# Patient Record
Sex: MALE | Race: BLACK OR AFRICAN AMERICAN
[De-identification: names, ages, dates, MRNs, and addresses within clinical notes are randomized per-mention and may not be internally consistent; named-entity substitution may affect disease eponyms.]

---

## 2020-02-10 NOTE — RAD
Exam: 

3 VIEWS CERVICAL SPINE:



HISTORY: 

Neck pain. Extensive cervical fusion.

Status post surgery



FINDINGS: 

Anterior fusion plate with transvertebral body screw at C4, C5, C6 and C7. There are bilateral poster
ior element screws at C4, C5, C6 and C7. C4-C5, C5-C6 and C6-C7 disc prosthesis

Moderate degenerative disc disease at C3-C4. No fractures. Straightening of cervical lordosis is pres
umed to be due to extensive fusion.

Cervicothoracic junction is unremarkable

Limited evaluation of the cervical spine in the AP projection due to overlying facial soft tissues, m
axilla and mandible



IMPRESSION: 

Extensive cervical fusion changes.



Transcribed Date/Time: 2/10/2020 2:41 PM



Reported By: Nehemias Montiel 

Electronically Signed:  2/10/2020 3:13 PM

## 2021-06-02 ENCOUNTER — HOSPITAL ENCOUNTER (OUTPATIENT)
Dept: HOSPITAL 92 - LABBT | Age: 86
Discharge: HOME | End: 2021-06-02
Attending: ORTHOPAEDIC SURGERY
Payer: MEDICARE

## 2021-06-02 DIAGNOSIS — Z20.822: ICD-10-CM

## 2021-06-02 DIAGNOSIS — Z01.818: Primary | ICD-10-CM

## 2021-06-02 DIAGNOSIS — G56.03: ICD-10-CM

## 2021-06-02 LAB
BASOPHILS # BLD AUTO: 0 10X3/UL (ref 0–0.2)
BASOPHILS NFR BLD AUTO: 0.4 % (ref 0–2)
EOSINOPHIL # BLD AUTO: 0.2 10X3/UL (ref 0–0.5)
EOSINOPHIL NFR BLD AUTO: 3.5 % (ref 0–6)
HGB BLD-MCNC: 12.9 G/DL (ref 13.5–17.5)
LYMPHOCYTES NFR BLD AUTO: 27.8 % (ref 18–47)
MCH RBC QN AUTO: 32.3 PG (ref 27–33)
MCV RBC AUTO: 100 FL (ref 81.2–95.1)
MONOCYTES # BLD AUTO: 0.6 10X3/UL (ref 0–1.1)
MONOCYTES NFR BLD AUTO: 11.2 % (ref 0–10)
NEUTROPHILS # BLD AUTO: 2.8 10X3/UL (ref 1.5–8.4)
NEUTROPHILS NFR BLD AUTO: 56.7 % (ref 40–75)
PLATELET # BLD AUTO: 123 10X3/UL (ref 150–450)
RBC # BLD AUTO: 3.99 10X6/UL (ref 4.32–5.72)
WBC # BLD AUTO: 4.9 10X3/UL (ref 3.5–10.5)

## 2021-06-02 PROCEDURE — 85025 COMPLETE CBC W/AUTO DIFF WBC: CPT

## 2021-06-02 PROCEDURE — U0003 INFECTIOUS AGENT DETECTION BY NUCLEIC ACID (DNA OR RNA); SEVERE ACUTE RESPIRATORY SYNDROME CORONAVIRUS 2 (SARS-COV-2) (CORONAVIRUS DISEASE [COVID-19]), AMPLIFIED PROBE TECHNIQUE, MAKING USE OF HIGH THROUGHPUT TECHNOLOGIES AS DESCRIBED BY CMS-2020-01-R: HCPCS

## 2021-06-02 PROCEDURE — 93010 ELECTROCARDIOGRAM REPORT: CPT

## 2021-06-02 PROCEDURE — 93005 ELECTROCARDIOGRAM TRACING: CPT

## 2021-06-02 PROCEDURE — U0005 INFEC AGEN DETEC AMPLI PROBE: HCPCS

## 2021-06-03 LAB
BACTERIA UR QL AUTO: (no result) HPF
RBC UR QL AUTO: (no result) HPF (ref 0–3)
SP GR UR STRIP: 1.02 (ref 1–1.04)

## 2021-06-07 ENCOUNTER — HOSPITAL ENCOUNTER (OUTPATIENT)
Dept: HOSPITAL 92 - SDC | Age: 86
Discharge: HOME | End: 2021-06-07
Attending: ORTHOPAEDIC SURGERY
Payer: MEDICARE

## 2021-06-07 VITALS — BODY MASS INDEX: 20.6 KG/M2

## 2021-06-07 DIAGNOSIS — M20.092: ICD-10-CM

## 2021-06-07 DIAGNOSIS — M24.512: ICD-10-CM

## 2021-06-07 DIAGNOSIS — G56.03: Primary | ICD-10-CM

## 2021-06-07 DIAGNOSIS — M24.522: ICD-10-CM

## 2021-06-07 DIAGNOSIS — M18.0: ICD-10-CM

## 2021-06-07 DIAGNOSIS — I11.9: ICD-10-CM

## 2021-06-07 DIAGNOSIS — J45.909: ICD-10-CM

## 2021-06-07 DIAGNOSIS — M75.02: ICD-10-CM

## 2021-06-07 DIAGNOSIS — Z87.891: ICD-10-CM

## 2021-06-07 DIAGNOSIS — M75.112: ICD-10-CM

## 2021-06-07 DIAGNOSIS — E78.00: ICD-10-CM

## 2021-06-07 DIAGNOSIS — G56.23: ICD-10-CM

## 2021-06-07 DIAGNOSIS — Z95.1: ICD-10-CM

## 2021-06-07 PROCEDURE — S0028 INJECTION, FAMOTIDINE, 20 MG: HCPCS

## 2021-06-07 PROCEDURE — S0020 INJECTION, BUPIVICAINE HYDRO: HCPCS

## 2021-06-07 PROCEDURE — 01N50ZZ RELEASE MEDIAN NERVE, OPEN APPROACH: ICD-10-PCS | Performed by: ORTHOPAEDIC SURGERY

## 2021-06-08 ENCOUNTER — HOSPITAL ENCOUNTER (EMERGENCY)
Dept: HOSPITAL 92 - ERS | Age: 86
Discharge: HOME | End: 2021-06-08
Payer: MEDICARE

## 2021-06-08 DIAGNOSIS — I25.10: ICD-10-CM

## 2021-06-08 DIAGNOSIS — Z79.82: ICD-10-CM

## 2021-06-08 DIAGNOSIS — R33.9: ICD-10-CM

## 2021-06-08 DIAGNOSIS — I10: ICD-10-CM

## 2021-06-08 DIAGNOSIS — Z79.899: ICD-10-CM

## 2021-06-08 DIAGNOSIS — I25.2: ICD-10-CM

## 2021-06-08 DIAGNOSIS — N99.89: Primary | ICD-10-CM

## 2021-06-08 DIAGNOSIS — J45.909: ICD-10-CM

## 2021-06-08 LAB
BACTERIA UR QL AUTO: (no result) HPF
SP GR UR STRIP: 1.02 (ref 1–1.03)
WBC UR QL AUTO: (no result) HPF (ref 0–3)

## 2021-06-08 PROCEDURE — 51702 INSERT TEMP BLADDER CATH: CPT

## 2021-06-08 PROCEDURE — 81003 URINALYSIS AUTO W/O SCOPE: CPT

## 2021-06-08 PROCEDURE — 81015 MICROSCOPIC EXAM OF URINE: CPT

## 2021-06-08 PROCEDURE — 87086 URINE CULTURE/COLONY COUNT: CPT

## 2021-06-08 PROCEDURE — 99283 EMERGENCY DEPT VISIT LOW MDM: CPT

## 2021-06-08 PROCEDURE — 51798 US URINE CAPACITY MEASURE: CPT

## 2021-06-11 ENCOUNTER — HOSPITAL ENCOUNTER (EMERGENCY)
Dept: HOSPITAL 92 - ERS | Age: 86
Discharge: HOME | End: 2021-06-11
Payer: MEDICARE

## 2021-06-11 DIAGNOSIS — I25.2: ICD-10-CM

## 2021-06-11 DIAGNOSIS — Z79.899: ICD-10-CM

## 2021-06-11 DIAGNOSIS — J45.909: ICD-10-CM

## 2021-06-11 DIAGNOSIS — I10: ICD-10-CM

## 2021-06-11 DIAGNOSIS — Z79.82: ICD-10-CM

## 2021-06-11 DIAGNOSIS — R55: Primary | ICD-10-CM

## 2021-06-11 DIAGNOSIS — I25.10: ICD-10-CM

## 2021-06-11 LAB
ALBUMIN SERPL BCG-MCNC: 3.4 G/DL (ref 3.4–4.8)
ALP SERPL-CCNC: 69 U/L (ref 40–110)
ALT SERPL W P-5'-P-CCNC: 11 U/L (ref 8–55)
ANION GAP SERPL CALC-SCNC: 12 MMOL/L (ref 10–20)
AST SERPL-CCNC: 16 U/L (ref 5–34)
BACTERIA UR QL AUTO: (no result) HPF
BASOPHILS # BLD AUTO: 0 THOU/UL (ref 0–0.2)
BASOPHILS NFR BLD AUTO: 0.7 % (ref 0–1)
BILIRUB SERPL-MCNC: 0.6 MG/DL (ref 0.2–1.2)
BUN SERPL-MCNC: 23 MG/DL (ref 8.4–25.7)
CALCIUM SERPL-MCNC: 9 MG/DL (ref 7.8–10.44)
CHLORIDE SERPL-SCNC: 108 MMOL/L (ref 98–107)
CO2 SERPL-SCNC: 25 MMOL/L (ref 23–31)
CREAT CL PREDICTED SERPL C-G-VRATE: 0 ML/MIN (ref 70–130)
EOSINOPHIL # BLD AUTO: 0.1 THOU/UL (ref 0–0.7)
EOSINOPHIL NFR BLD AUTO: 2.1 % (ref 0–10)
GLOBULIN SER CALC-MCNC: 2.8 G/DL (ref 2.4–3.5)
GLUCOSE SERPL-MCNC: 127 MG/DL (ref 83–110)
HGB BLD-MCNC: 12.9 G/DL (ref 14–18)
LYMPHOCYTES # BLD: 1 THOU/UL (ref 1.2–3.4)
LYMPHOCYTES NFR BLD AUTO: 15.8 % (ref 21–51)
MCH RBC QN AUTO: 33.6 PG (ref 27–31)
MCV RBC AUTO: 104 FL (ref 78–98)
MONOCYTES # BLD AUTO: 0.8 THOU/UL (ref 0.11–0.59)
MONOCYTES NFR BLD AUTO: 11.9 % (ref 0–10)
NEUTROPHILS # BLD AUTO: 4.6 THOU/UL (ref 1.4–6.5)
NEUTROPHILS NFR BLD AUTO: 69.5 % (ref 42–75)
PLATELET # BLD AUTO: 121 THOU/UL (ref 130–400)
POTASSIUM SERPL-SCNC: 4.1 MMOL/L (ref 3.5–5.1)
PROT UR STRIP.AUTO-MCNC: 30 MG/DL
RBC # BLD AUTO: 3.82 MILL/UL (ref 4.7–6.1)
SODIUM SERPL-SCNC: 141 MMOL/L (ref 136–145)
SP GR UR STRIP: 1.02 (ref 1–1.03)
WBC # BLD AUTO: 6.6 THOU/UL (ref 4.8–10.8)
WBC UR QL AUTO: (no result) HPF (ref 0–3)

## 2021-06-11 PROCEDURE — 84484 ASSAY OF TROPONIN QUANT: CPT

## 2021-06-11 PROCEDURE — 36415 COLL VENOUS BLD VENIPUNCTURE: CPT

## 2021-06-11 PROCEDURE — 80053 COMPREHEN METABOLIC PANEL: CPT

## 2021-06-11 PROCEDURE — 85025 COMPLETE CBC W/AUTO DIFF WBC: CPT

## 2021-06-11 PROCEDURE — 81003 URINALYSIS AUTO W/O SCOPE: CPT

## 2021-06-11 PROCEDURE — 81015 MICROSCOPIC EXAM OF URINE: CPT

## 2021-06-11 PROCEDURE — 93005 ELECTROCARDIOGRAM TRACING: CPT

## 2021-07-26 ENCOUNTER — HOSPITAL ENCOUNTER (OUTPATIENT)
Dept: HOSPITAL 92 - LABBT | Age: 86
Discharge: HOME | End: 2021-07-26
Attending: ORTHOPAEDIC SURGERY
Payer: MEDICARE

## 2021-07-26 DIAGNOSIS — R33.8: ICD-10-CM

## 2021-07-26 DIAGNOSIS — I25.10: ICD-10-CM

## 2021-07-26 DIAGNOSIS — R31.29: ICD-10-CM

## 2021-07-26 DIAGNOSIS — Z87.440: ICD-10-CM

## 2021-07-26 DIAGNOSIS — N40.1: ICD-10-CM

## 2021-07-26 DIAGNOSIS — R39.14: ICD-10-CM

## 2021-07-26 DIAGNOSIS — Z86.19: ICD-10-CM

## 2021-07-26 DIAGNOSIS — Z01.818: Primary | ICD-10-CM

## 2021-07-26 LAB
ANION GAP SERPL CALC-SCNC: 10 MMOL/L (ref 10–20)
APTT PPP: 27.5 SEC (ref 22–33)
BUN SERPL-MCNC: 15 MG/DL (ref 8.4–25.7)
CALCIUM SERPL-MCNC: 9.8 MG/DL (ref 7.8–10.44)
CHLORIDE SERPL-SCNC: 108 MMOL/L (ref 98–107)
CO2 SERPL-SCNC: 27 MMOL/L (ref 23–31)
CREAT CL PREDICTED SERPL C-G-VRATE: 0 ML/MIN (ref 70–130)
GLUCOSE SERPL-MCNC: 106 MG/DL (ref 83–110)
HGB BLD-MCNC: 12.7 G/DL (ref 13.5–17.5)
INR PPP: 1.1
MCH RBC QN AUTO: 31.8 PG (ref 27–33)
MCV RBC AUTO: 97 FL (ref 81.2–95.1)
PLATELET # BLD AUTO: 187 10X3/UL (ref 150–450)
POTASSIUM SERPL-SCNC: 4.4 MMOL/L (ref 3.5–5.1)
PROTHROMBIN TIME: 12.2 SEC (ref 9.5–12.1)
RBC # BLD AUTO: 3.99 10X6/UL (ref 4.32–5.72)
SODIUM SERPL-SCNC: 141 MMOL/L (ref 136–145)
WBC # BLD AUTO: 4.4 10X3/UL (ref 3.5–10.5)

## 2021-07-26 PROCEDURE — 93005 ELECTROCARDIOGRAM TRACING: CPT

## 2021-07-26 PROCEDURE — 85730 THROMBOPLASTIN TIME PARTIAL: CPT

## 2021-07-26 PROCEDURE — 85027 COMPLETE CBC AUTOMATED: CPT

## 2021-07-26 PROCEDURE — 85610 PROTHROMBIN TIME: CPT

## 2021-07-26 PROCEDURE — 80048 BASIC METABOLIC PNL TOTAL CA: CPT

## 2021-07-26 PROCEDURE — 93010 ELECTROCARDIOGRAM REPORT: CPT

## 2021-07-26 PROCEDURE — 81001 URINALYSIS AUTO W/SCOPE: CPT

## 2021-10-20 ENCOUNTER — HOSPITAL ENCOUNTER (OUTPATIENT)
Dept: HOSPITAL 92 - LABBT | Age: 86
Discharge: HOME | End: 2021-10-20
Attending: ORTHOPAEDIC SURGERY
Payer: MEDICARE

## 2021-10-20 DIAGNOSIS — Z20.822: ICD-10-CM

## 2021-10-20 DIAGNOSIS — Z01.812: Primary | ICD-10-CM

## 2021-10-20 DIAGNOSIS — M24.50: ICD-10-CM

## 2021-10-20 LAB
ANION GAP SERPL CALC-SCNC: 14 MMOL/L (ref 10–20)
BASOPHILS # BLD AUTO: 0 10X3/UL (ref 0–0.2)
BASOPHILS NFR BLD AUTO: 0.7 % (ref 0–2)
BUN SERPL-MCNC: 24 MG/DL (ref 8.4–25.7)
CALCIUM SERPL-MCNC: 9.4 MG/DL (ref 7.8–10.44)
CHLORIDE SERPL-SCNC: 109 MMOL/L (ref 98–107)
CO2 SERPL-SCNC: 24 MMOL/L (ref 23–31)
CREAT CL PREDICTED SERPL C-G-VRATE: 0 ML/MIN (ref 70–130)
EOSINOPHIL # BLD AUTO: 0.3 10X3/UL (ref 0–0.5)
EOSINOPHIL NFR BLD AUTO: 6.3 % (ref 0–6)
GLUCOSE SERPL-MCNC: 114 MG/DL (ref 83–110)
HGB BLD-MCNC: 12.2 G/DL (ref 13.5–17.5)
LYMPHOCYTES NFR BLD AUTO: 32.9 % (ref 18–47)
MCH RBC QN AUTO: 32.7 PG (ref 27–33)
MCV RBC AUTO: 100.5 FL (ref 81.2–95.1)
MONOCYTES # BLD AUTO: 0.4 10X3/UL (ref 0–1.1)
MONOCYTES NFR BLD AUTO: 8.4 % (ref 0–10)
NEUTROPHILS # BLD AUTO: 2.2 10X3/UL (ref 1.5–8.4)
NEUTROPHILS NFR BLD AUTO: 51.5 % (ref 40–75)
PLATELET # BLD AUTO: 107 10X3/UL (ref 150–450)
POTASSIUM SERPL-SCNC: 4.6 MMOL/L (ref 3.5–5.1)
RBC # BLD AUTO: 3.73 10X6/UL (ref 4.32–5.72)
SODIUM SERPL-SCNC: 142 MMOL/L (ref 136–145)
WBC # BLD AUTO: 4.3 10X3/UL (ref 3.5–10.5)

## 2021-10-20 PROCEDURE — 80048 BASIC METABOLIC PNL TOTAL CA: CPT

## 2021-10-20 PROCEDURE — U0003 INFECTIOUS AGENT DETECTION BY NUCLEIC ACID (DNA OR RNA); SEVERE ACUTE RESPIRATORY SYNDROME CORONAVIRUS 2 (SARS-COV-2) (CORONAVIRUS DISEASE [COVID-19]), AMPLIFIED PROBE TECHNIQUE, MAKING USE OF HIGH THROUGHPUT TECHNOLOGIES AS DESCRIBED BY CMS-2020-01-R: HCPCS

## 2021-10-20 PROCEDURE — 85025 COMPLETE CBC W/AUTO DIFF WBC: CPT

## 2021-10-20 PROCEDURE — U0005 INFEC AGEN DETEC AMPLI PROBE: HCPCS

## 2021-10-25 ENCOUNTER — HOSPITAL ENCOUNTER (OUTPATIENT)
Dept: HOSPITAL 92 - SDC | Age: 86
Discharge: HOME | End: 2021-10-25
Attending: ORTHOPAEDIC SURGERY
Payer: MEDICARE

## 2021-10-25 VITALS — BODY MASS INDEX: 20 KG/M2

## 2021-10-25 DIAGNOSIS — I25.10: ICD-10-CM

## 2021-10-25 DIAGNOSIS — Z79.899: ICD-10-CM

## 2021-10-25 DIAGNOSIS — J45.909: ICD-10-CM

## 2021-10-25 DIAGNOSIS — M24.542: ICD-10-CM

## 2021-10-25 DIAGNOSIS — M24.532: ICD-10-CM

## 2021-10-25 DIAGNOSIS — Z98.1: ICD-10-CM

## 2021-10-25 DIAGNOSIS — Z95.1: ICD-10-CM

## 2021-10-25 DIAGNOSIS — M24.522: Primary | ICD-10-CM

## 2021-10-25 DIAGNOSIS — I10: ICD-10-CM

## 2021-10-25 DIAGNOSIS — G56.03: ICD-10-CM

## 2021-10-25 DIAGNOSIS — G56.23: ICD-10-CM

## 2021-10-25 DIAGNOSIS — M48.02: ICD-10-CM

## 2021-10-25 DIAGNOSIS — I25.2: ICD-10-CM

## 2021-10-25 DIAGNOSIS — M75.122: ICD-10-CM

## 2021-10-25 DIAGNOSIS — Z98.890: ICD-10-CM

## 2021-10-25 DIAGNOSIS — M18.0: ICD-10-CM

## 2021-10-25 DIAGNOSIS — Z79.82: ICD-10-CM

## 2021-10-25 DIAGNOSIS — M75.02: ICD-10-CM

## 2021-10-25 DIAGNOSIS — Z87.891: ICD-10-CM

## 2021-10-25 PROCEDURE — 64642 CHEMODENERV 1 EXTREMITY 1-4: CPT

## 2021-10-25 PROCEDURE — S0020 INJECTION, BUPIVICAINE HYDRO: HCPCS

## 2021-10-25 PROCEDURE — 3E023GC INTRODUCTION OF OTHER THERAPEUTIC SUBSTANCE INTO MUSCLE, PERCUTANEOUS APPROACH: ICD-10-PCS | Performed by: ORTHOPAEDIC SURGERY

## 2021-11-18 ENCOUNTER — HOSPITAL ENCOUNTER (EMERGENCY)
Dept: HOSPITAL 92 - ERS | Age: 86
Discharge: LEFT BEFORE DISCHARGE | End: 2021-11-18
Payer: MEDICARE

## 2021-11-18 DIAGNOSIS — I10: ICD-10-CM

## 2021-11-18 DIAGNOSIS — J45.909: ICD-10-CM

## 2021-11-18 DIAGNOSIS — I25.10: ICD-10-CM

## 2021-11-18 DIAGNOSIS — I25.2: ICD-10-CM

## 2021-11-18 DIAGNOSIS — R00.1: Primary | ICD-10-CM

## 2021-11-18 LAB
ALBUMIN SERPL BCG-MCNC: 3.5 G/DL (ref 3.4–4.8)
ALP SERPL-CCNC: 46 U/L (ref 40–110)
ALT SERPL W P-5'-P-CCNC: 11 U/L (ref 8–55)
ANION GAP SERPL CALC-SCNC: 13 MMOL/L (ref 10–20)
AST SERPL-CCNC: 27 U/L (ref 5–34)
BASOPHILS # BLD AUTO: 0 THOU/UL (ref 0–0.2)
BASOPHILS NFR BLD AUTO: 0.7 % (ref 0–1)
BILIRUB SERPL-MCNC: 0.4 MG/DL (ref 0.2–1.2)
BUN SERPL-MCNC: 20 MG/DL (ref 8.4–25.7)
CALCIUM SERPL-MCNC: 9.7 MG/DL (ref 7.8–10.44)
CHLORIDE SERPL-SCNC: 107 MMOL/L (ref 98–107)
CO2 SERPL-SCNC: 23 MMOL/L (ref 23–31)
CREAT CL PREDICTED SERPL C-G-VRATE: 0 ML/MIN (ref 70–130)
EOSINOPHIL # BLD AUTO: 0.2 THOU/UL (ref 0–0.7)
EOSINOPHIL NFR BLD AUTO: 4.5 % (ref 0–10)
GLOBULIN SER CALC-MCNC: 4 G/DL (ref 2.4–3.5)
GLUCOSE SERPL-MCNC: 119 MG/DL (ref 83–110)
HGB BLD-MCNC: 13.7 G/DL (ref 14–18)
LIPASE SERPL-CCNC: 16 U/L (ref 8–78)
LYMPHOCYTES # BLD: 1.4 THOU/UL (ref 1.2–3.4)
LYMPHOCYTES NFR BLD AUTO: 31.5 % (ref 21–51)
MCH RBC QN AUTO: 34.4 PG (ref 27–31)
MCV RBC AUTO: 103 FL (ref 78–98)
MONOCYTES # BLD AUTO: 0.3 THOU/UL (ref 0.11–0.59)
MONOCYTES NFR BLD AUTO: 7.6 % (ref 0–10)
NEUTROPHILS # BLD AUTO: 2.5 THOU/UL (ref 1.4–6.5)
NEUTROPHILS NFR BLD AUTO: 55.7 % (ref 42–75)
PLATELET # BLD AUTO: 104 THOU/UL (ref 130–400)
POTASSIUM SERPL-SCNC: 5.4 MMOL/L (ref 3.5–5.1)
RBC # BLD AUTO: 3.99 MILL/UL (ref 4.7–6.1)
SODIUM SERPL-SCNC: 138 MMOL/L (ref 136–145)
WBC # BLD AUTO: 4.5 THOU/UL (ref 4.8–10.8)

## 2021-11-18 PROCEDURE — 93005 ELECTROCARDIOGRAM TRACING: CPT

## 2021-11-18 PROCEDURE — 84443 ASSAY THYROID STIM HORMONE: CPT

## 2021-11-18 PROCEDURE — 84484 ASSAY OF TROPONIN QUANT: CPT

## 2021-11-18 PROCEDURE — 80053 COMPREHEN METABOLIC PANEL: CPT

## 2021-11-18 PROCEDURE — 83690 ASSAY OF LIPASE: CPT

## 2021-11-18 PROCEDURE — 71045 X-RAY EXAM CHEST 1 VIEW: CPT

## 2021-11-18 PROCEDURE — 85025 COMPLETE CBC W/AUTO DIFF WBC: CPT

## 2021-12-21 ENCOUNTER — HOSPITAL ENCOUNTER (INPATIENT)
Dept: HOSPITAL 92 - ERS | Age: 86
LOS: 3 days | Discharge: HOME HEALTH | DRG: 871 | End: 2021-12-24
Attending: FAMILY MEDICINE | Admitting: FAMILY MEDICINE
Payer: MEDICARE

## 2021-12-21 VITALS — BODY MASS INDEX: 18.5 KG/M2

## 2021-12-21 DIAGNOSIS — Z90.49: ICD-10-CM

## 2021-12-21 DIAGNOSIS — E44.1: ICD-10-CM

## 2021-12-21 DIAGNOSIS — A41.9: Primary | ICD-10-CM

## 2021-12-21 DIAGNOSIS — R65.20: ICD-10-CM

## 2021-12-21 DIAGNOSIS — Z79.82: ICD-10-CM

## 2021-12-21 DIAGNOSIS — R74.01: ICD-10-CM

## 2021-12-21 DIAGNOSIS — K26.9: ICD-10-CM

## 2021-12-21 DIAGNOSIS — M19.90: ICD-10-CM

## 2021-12-21 DIAGNOSIS — Z20.822: ICD-10-CM

## 2021-12-21 DIAGNOSIS — M24.542: ICD-10-CM

## 2021-12-21 DIAGNOSIS — Z79.899: ICD-10-CM

## 2021-12-21 DIAGNOSIS — E78.5: ICD-10-CM

## 2021-12-21 DIAGNOSIS — Z79.51: ICD-10-CM

## 2021-12-21 DIAGNOSIS — K80.30: ICD-10-CM

## 2021-12-21 DIAGNOSIS — G93.41: ICD-10-CM

## 2021-12-21 DIAGNOSIS — K83.8: ICD-10-CM

## 2021-12-21 DIAGNOSIS — G45.9: ICD-10-CM

## 2021-12-21 LAB
ALBUMIN SERPL BCG-MCNC: 3.4 G/DL (ref 3.4–4.8)
ALP SERPL-CCNC: 160 U/L (ref 40–110)
ALT SERPL W P-5'-P-CCNC: 492 U/L (ref 8–55)
ANION GAP SERPL CALC-SCNC: 13 MMOL/L (ref 10–20)
AST SERPL-CCNC: 771 U/L (ref 5–34)
BASOPHILS # BLD AUTO: 0 THOU/UL (ref 0–0.2)
BASOPHILS NFR BLD AUTO: 0.2 % (ref 0–1)
BILIRUB SERPL-MCNC: 2.1 MG/DL (ref 0.2–1.2)
BUN SERPL-MCNC: 12 MG/DL (ref 8.4–25.7)
CALCIUM SERPL-MCNC: 9.7 MG/DL (ref 7.8–10.44)
CHLORIDE SERPL-SCNC: 105 MMOL/L (ref 98–107)
CO2 SERPL-SCNC: 25 MMOL/L (ref 23–31)
CREAT CL PREDICTED SERPL C-G-VRATE: 0 ML/MIN (ref 70–130)
EOSINOPHIL # BLD AUTO: 0.1 THOU/UL (ref 0–0.7)
EOSINOPHIL NFR BLD AUTO: 1.2 % (ref 0–10)
GLOBULIN SER CALC-MCNC: 3.4 G/DL (ref 2.4–3.5)
GLUCOSE SERPL-MCNC: 131 MG/DL (ref 83–110)
HGB BLD-MCNC: 12.9 G/DL (ref 14–18)
LYMPHOCYTES # BLD: 0.4 THOU/UL (ref 1.2–3.4)
LYMPHOCYTES NFR BLD AUTO: 4.9 % (ref 21–51)
MCH RBC QN AUTO: 34.3 PG (ref 27–31)
MCV RBC AUTO: 103 FL (ref 78–98)
MONOCYTES # BLD AUTO: 0.2 THOU/UL (ref 0.11–0.59)
MONOCYTES NFR BLD AUTO: 2.6 % (ref 0–10)
NEUTROPHILS # BLD AUTO: 6.5 THOU/UL (ref 1.4–6.5)
NEUTROPHILS NFR BLD AUTO: 91.1 % (ref 42–75)
PLATELET # BLD AUTO: 86 THOU/UL (ref 130–400)
POTASSIUM SERPL-SCNC: 3.8 MMOL/L (ref 3.5–5.1)
PROT UR STRIP.AUTO-MCNC: 50 MG/DL
RBC # BLD AUTO: 3.76 MILL/UL (ref 4.7–6.1)
RBC UR QL AUTO: (no result) HPF (ref 0–3)
SODIUM SERPL-SCNC: 139 MMOL/L (ref 136–145)
SP GR UR STRIP: 1.02 (ref 1–1.04)
WBC # BLD AUTO: 7.1 THOU/UL (ref 4.8–10.8)
WBC UR QL AUTO: (no result) HPF (ref 0–3)

## 2021-12-21 PROCEDURE — 0240U: CPT

## 2021-12-21 PROCEDURE — 80061 LIPID PANEL: CPT

## 2021-12-21 PROCEDURE — 36415 COLL VENOUS BLD VENIPUNCTURE: CPT

## 2021-12-21 PROCEDURE — 85025 COMPLETE CBC W/AUTO DIFF WBC: CPT

## 2021-12-21 PROCEDURE — 93005 ELECTROCARDIOGRAM TRACING: CPT

## 2021-12-21 PROCEDURE — 96368 THER/DIAG CONCURRENT INF: CPT

## 2021-12-21 PROCEDURE — 74177 CT ABD & PELVIS W/CONTRAST: CPT

## 2021-12-21 PROCEDURE — 83690 ASSAY OF LIPASE: CPT

## 2021-12-21 PROCEDURE — 87389 HIV-1 AG W/HIV-1&-2 AB AG IA: CPT

## 2021-12-21 PROCEDURE — 82607 VITAMIN B-12: CPT

## 2021-12-21 PROCEDURE — 96365 THER/PROPH/DIAG IV INF INIT: CPT

## 2021-12-21 PROCEDURE — 87149 DNA/RNA DIRECT PROBE: CPT

## 2021-12-21 PROCEDURE — 76705 ECHO EXAM OF ABDOMEN: CPT

## 2021-12-21 PROCEDURE — 70551 MRI BRAIN STEM W/O DYE: CPT

## 2021-12-21 PROCEDURE — 84145 PROCALCITONIN (PCT): CPT

## 2021-12-21 PROCEDURE — 86780 TREPONEMA PALLIDUM: CPT

## 2021-12-21 PROCEDURE — 83605 ASSAY OF LACTIC ACID: CPT

## 2021-12-21 PROCEDURE — 85730 THROMBOPLASTIN TIME PARTIAL: CPT

## 2021-12-21 PROCEDURE — 85610 PROTHROMBIN TIME: CPT

## 2021-12-21 PROCEDURE — 80307 DRUG TEST PRSMV CHEM ANLYZR: CPT

## 2021-12-21 PROCEDURE — 76000 FLUOROSCOPY <1 HR PHYS/QHP: CPT

## 2021-12-21 PROCEDURE — 82746 ASSAY OF FOLIC ACID SERUM: CPT

## 2021-12-21 PROCEDURE — 81015 MICROSCOPIC EXAM OF URINE: CPT

## 2021-12-21 PROCEDURE — 80053 COMPREHEN METABOLIC PANEL: CPT

## 2021-12-21 PROCEDURE — 80074 ACUTE HEPATITIS PANEL: CPT

## 2021-12-21 PROCEDURE — 81003 URINALYSIS AUTO W/O SCOPE: CPT

## 2021-12-21 PROCEDURE — C9113 INJ PANTOPRAZOLE SODIUM, VIA: HCPCS

## 2021-12-21 PROCEDURE — 51701 INSERT BLADDER CATHETER: CPT

## 2021-12-21 PROCEDURE — 87040 BLOOD CULTURE FOR BACTERIA: CPT

## 2021-12-21 PROCEDURE — 74330 X-RAY BILE/PANC ENDOSCOPY: CPT

## 2021-12-21 PROCEDURE — 71045 X-RAY EXAM CHEST 1 VIEW: CPT

## 2021-12-21 PROCEDURE — S0028 INJECTION, FAMOTIDINE, 20 MG: HCPCS

## 2021-12-22 LAB
ALBUMIN SERPL BCG-MCNC: 2.9 G/DL (ref 3.4–4.8)
ALP SERPL-CCNC: 147 U/L (ref 40–110)
ALT SERPL W P-5'-P-CCNC: 540 U/L (ref 8–55)
ANION GAP SERPL CALC-SCNC: 10 MMOL/L (ref 10–20)
APTT PPP: 38.7 SEC (ref 22.9–36.1)
AST SERPL-CCNC: 536 U/L (ref 5–34)
BASOPHILS # BLD AUTO: 0 THOU/UL (ref 0–0.2)
BASOPHILS NFR BLD AUTO: 0.2 % (ref 0–1)
BILIRUB SERPL-MCNC: 1.4 MG/DL (ref 0.2–1.2)
BUN SERPL-MCNC: 9 MG/DL (ref 8.4–25.7)
CALCIUM SERPL-MCNC: 9 MG/DL (ref 7.8–10.44)
CHD RISK SERPL-RTO: 2.5 (ref ?–4.5)
CHLORIDE SERPL-SCNC: 109 MMOL/L (ref 98–107)
CHOLEST SERPL-MCNC: 115 MG/DL
CO2 SERPL-SCNC: 25 MMOL/L (ref 23–31)
CREAT CL PREDICTED SERPL C-G-VRATE: 71 ML/MIN (ref 70–130)
EOSINOPHIL # BLD AUTO: 0.2 THOU/UL (ref 0–0.7)
EOSINOPHIL NFR BLD AUTO: 2.7 % (ref 0–10)
GLOBULIN SER CALC-MCNC: 2.8 G/DL (ref 2.4–3.5)
GLUCOSE SERPL-MCNC: 95 MG/DL (ref 83–110)
HBCM INDEX: 0.05 S/CO (ref 0–0.79)
HBSAG INDEX: 0.29 S/CO (ref 0–0.99)
HDLC SERPL-MCNC: 46 MG/DL
HEP A IGM S/CO: 0.2 S/CO (ref 0–0.79)
HEP C INDEX: 0.1 S/CO (ref 0–0.79)
HGB BLD-MCNC: 11.6 G/DL (ref 14–18)
HIV 1/2 INDEX: 0.09 S/CO (ref ?–1)
INR PPP: 1.5
LDLC SERPL CALC-MCNC: 62 MG/DL
LYMPHOCYTES # BLD: 1 THOU/UL (ref 1.2–3.4)
LYMPHOCYTES NFR BLD AUTO: 10.8 % (ref 21–51)
MCH RBC QN AUTO: 34 PG (ref 27–31)
MCV RBC AUTO: 103 FL (ref 78–98)
MONOCYTES # BLD AUTO: 0.7 THOU/UL (ref 0.11–0.59)
MONOCYTES NFR BLD AUTO: 7.9 % (ref 0–10)
NEUTROPHILS # BLD AUTO: 7 THOU/UL (ref 1.4–6.5)
NEUTROPHILS NFR BLD AUTO: 78.4 % (ref 42–75)
PLATELET # BLD AUTO: 76 THOU/UL (ref 130–400)
POTASSIUM SERPL-SCNC: 3.5 MMOL/L (ref 3.5–5.1)
PROTHROMBIN TIME: 18.7 SEC (ref 12–14.7)
RBC # BLD AUTO: 3.42 MILL/UL (ref 4.7–6.1)
SODIUM SERPL-SCNC: 140 MMOL/L (ref 136–145)
SYPHILIS ANTIBODY INDEX: 0.08 S/CO
TRIGL SERPL-MCNC: 36 MG/DL (ref ?–150)
WBC # BLD AUTO: 8.9 THOU/UL (ref 4.8–10.8)

## 2021-12-22 RX ADMIN — Medication SCH TAB: at 10:23

## 2021-12-22 RX ADMIN — MOMETASONE FUROATE AND FORMOTEROL FUMARATE DIHYDRATE SCH PUFF: 200; 5 AEROSOL RESPIRATORY (INHALATION) at 19:26

## 2021-12-23 LAB
ALBUMIN SERPL BCG-MCNC: 2.9 G/DL (ref 3.4–4.8)
ALP SERPL-CCNC: 127 U/L (ref 40–110)
ALT SERPL W P-5'-P-CCNC: 303 U/L (ref 8–55)
ANION GAP SERPL CALC-SCNC: 17 MMOL/L (ref 10–20)
AST SERPL-CCNC: 168 U/L (ref 5–34)
BASOPHILS # BLD AUTO: 0 THOU/UL (ref 0–0.2)
BASOPHILS NFR BLD AUTO: 0.3 % (ref 0–1)
BILIRUB SERPL-MCNC: 1 MG/DL (ref 0.2–1.2)
BUN SERPL-MCNC: 9 MG/DL (ref 8.4–25.7)
CALCIUM SERPL-MCNC: 9.1 MG/DL (ref 7.8–10.44)
CHLORIDE SERPL-SCNC: 107 MMOL/L (ref 98–107)
CO2 SERPL-SCNC: 17 MMOL/L (ref 23–31)
CREAT CL PREDICTED SERPL C-G-VRATE: 71 ML/MIN (ref 70–130)
EOSINOPHIL # BLD AUTO: 0.4 THOU/UL (ref 0–0.7)
EOSINOPHIL NFR BLD AUTO: 6.2 % (ref 0–10)
GLOBULIN SER CALC-MCNC: 3.1 G/DL (ref 2.4–3.5)
GLUCOSE SERPL-MCNC: 82 MG/DL (ref 83–110)
HGB BLD-MCNC: 11.5 G/DL (ref 14–18)
LYMPHOCYTES # BLD: 0.8 THOU/UL (ref 1.2–3.4)
LYMPHOCYTES NFR BLD AUTO: 13.5 % (ref 21–51)
MCH RBC QN AUTO: 33.4 PG (ref 27–31)
MCV RBC AUTO: 103 FL (ref 78–98)
MONOCYTES # BLD AUTO: 0.6 THOU/UL (ref 0.11–0.59)
MONOCYTES NFR BLD AUTO: 9.4 % (ref 0–10)
NEUTROPHILS # BLD AUTO: 4.4 THOU/UL (ref 1.4–6.5)
NEUTROPHILS NFR BLD AUTO: 70.7 % (ref 42–75)
PLATELET # BLD AUTO: 74 THOU/UL (ref 130–400)
POTASSIUM SERPL-SCNC: 3.7 MMOL/L (ref 3.5–5.1)
RBC # BLD AUTO: 3.46 MILL/UL (ref 4.7–6.1)
SODIUM SERPL-SCNC: 137 MMOL/L (ref 136–145)
WBC # BLD AUTO: 6.2 THOU/UL (ref 4.8–10.8)

## 2021-12-23 PROCEDURE — BF131ZZ FLUOROSCOPY OF GALLBLADDER AND BILE DUCTS USING LOW OSMOLAR CONTRAST: ICD-10-PCS | Performed by: INTERNAL MEDICINE

## 2021-12-23 PROCEDURE — 0FC98ZZ EXTIRPATION OF MATTER FROM COMMON BILE DUCT, VIA NATURAL OR ARTIFICIAL OPENING ENDOSCOPIC: ICD-10-PCS | Performed by: INTERNAL MEDICINE

## 2021-12-23 RX ADMIN — MOMETASONE FUROATE AND FORMOTEROL FUMARATE DIHYDRATE SCH PUFF: 200; 5 AEROSOL RESPIRATORY (INHALATION) at 23:16

## 2021-12-23 RX ADMIN — Medication SCH TAB: at 08:09

## 2021-12-24 VITALS — DIASTOLIC BLOOD PRESSURE: 60 MMHG | TEMPERATURE: 98.2 F | SYSTOLIC BLOOD PRESSURE: 106 MMHG

## 2021-12-24 LAB
ALBUMIN SERPL BCG-MCNC: 2.5 G/DL (ref 3.4–4.8)
ALP SERPL-CCNC: 95 U/L (ref 40–110)
ALT SERPL W P-5'-P-CCNC: 200 U/L (ref 8–55)
ANION GAP SERPL CALC-SCNC: 10 MMOL/L (ref 10–20)
AST SERPL-CCNC: 86 U/L (ref 5–34)
BASOPHILS # BLD AUTO: 0 THOU/UL (ref 0–0.2)
BASOPHILS NFR BLD AUTO: 0.6 % (ref 0–1)
BILIRUB SERPL-MCNC: 0.6 MG/DL (ref 0.2–1.2)
BUN SERPL-MCNC: 10 MG/DL (ref 8.4–25.7)
CALCIUM SERPL-MCNC: 8.4 MG/DL (ref 7.8–10.44)
CHLORIDE SERPL-SCNC: 108 MMOL/L (ref 98–107)
CO2 SERPL-SCNC: 25 MMOL/L (ref 23–31)
CREAT CL PREDICTED SERPL C-G-VRATE: 62 ML/MIN (ref 70–130)
EOSINOPHIL # BLD AUTO: 0.3 THOU/UL (ref 0–0.7)
EOSINOPHIL NFR BLD AUTO: 5.6 % (ref 0–10)
GLOBULIN SER CALC-MCNC: 2.5 G/DL (ref 2.4–3.5)
GLUCOSE SERPL-MCNC: 119 MG/DL (ref 83–110)
HGB BLD-MCNC: 11 G/DL (ref 14–18)
LIPASE SERPL-CCNC: 13 U/L (ref 8–78)
LYMPHOCYTES # BLD: 0.9 THOU/UL (ref 1.2–3.4)
LYMPHOCYTES NFR BLD AUTO: 15.6 % (ref 21–51)
MCH RBC QN AUTO: 34.7 PG (ref 27–31)
MCV RBC AUTO: 104 FL (ref 78–98)
MONOCYTES # BLD AUTO: 0.6 THOU/UL (ref 0.11–0.59)
MONOCYTES NFR BLD AUTO: 11.4 % (ref 0–10)
NEUTROPHILS # BLD AUTO: 3.6 THOU/UL (ref 1.4–6.5)
NEUTROPHILS NFR BLD AUTO: 66.8 % (ref 42–75)
PLATELET # BLD AUTO: 78 THOU/UL (ref 130–400)
POTASSIUM SERPL-SCNC: 3.7 MMOL/L (ref 3.5–5.1)
RBC # BLD AUTO: 3.16 MILL/UL (ref 4.7–6.1)
SODIUM SERPL-SCNC: 139 MMOL/L (ref 136–145)
WBC # BLD AUTO: 5.4 THOU/UL (ref 4.8–10.8)

## 2021-12-24 RX ADMIN — Medication SCH TAB: at 08:47

## 2022-07-01 ENCOUNTER — HOSPITAL ENCOUNTER (INPATIENT)
Dept: HOSPITAL 92 - ERS | Age: 87
LOS: 5 days | Discharge: NURSING, REHAB OR LONG-TERM CARE | DRG: 177 | End: 2022-07-06
Attending: INTERNAL MEDICINE | Admitting: HOSPITALIST
Payer: MEDICARE

## 2022-07-01 VITALS — BODY MASS INDEX: 16.1 KG/M2

## 2022-07-01 DIAGNOSIS — I11.0: ICD-10-CM

## 2022-07-01 DIAGNOSIS — E78.5: ICD-10-CM

## 2022-07-01 DIAGNOSIS — Z98.890: ICD-10-CM

## 2022-07-01 DIAGNOSIS — I50.32: ICD-10-CM

## 2022-07-01 DIAGNOSIS — Z87.891: ICD-10-CM

## 2022-07-01 DIAGNOSIS — U07.1: Primary | ICD-10-CM

## 2022-07-01 DIAGNOSIS — Z79.899: ICD-10-CM

## 2022-07-01 DIAGNOSIS — J12.82: ICD-10-CM

## 2022-07-01 DIAGNOSIS — Z95.1: ICD-10-CM

## 2022-07-01 DIAGNOSIS — I25.10: ICD-10-CM

## 2022-07-01 DIAGNOSIS — R13.12: ICD-10-CM

## 2022-07-01 DIAGNOSIS — J45.909: ICD-10-CM

## 2022-07-01 DIAGNOSIS — Z90.49: ICD-10-CM

## 2022-07-01 LAB
ALBUMIN SERPL BCG-MCNC: 3.2 G/DL (ref 3.4–4.8)
ALP SERPL-CCNC: 68 U/L (ref 40–110)
ALT SERPL W P-5'-P-CCNC: 11 U/L (ref 8–55)
ANION GAP SERPL CALC-SCNC: 13 MMOL/L (ref 10–20)
AST SERPL-CCNC: 17 U/L (ref 5–34)
BILIRUB SERPL-MCNC: 0.3 MG/DL (ref 0.2–1.2)
BUN SERPL-MCNC: 14 MG/DL (ref 8.4–25.7)
CALCIUM SERPL-MCNC: 8.9 MG/DL (ref 7.8–10.44)
CHLORIDE SERPL-SCNC: 105 MMOL/L (ref 98–107)
CO2 SERPL-SCNC: 25 MMOL/L (ref 23–31)
CREAT CL PREDICTED SERPL C-G-VRATE: 0 ML/MIN (ref 70–130)
GLOBULIN SER CALC-MCNC: 3.2 G/DL (ref 2.4–3.5)
GLUCOSE SERPL-MCNC: 132 MG/DL (ref 83–110)
HGB BLD-MCNC: 13.3 G/DL (ref 14–18)
MCH RBC QN AUTO: 33.3 PG (ref 27–31)
MCV RBC AUTO: 104 FL (ref 78–98)
MDIFF COMPLETE?: YES
PLATELET # BLD AUTO: 109 THOU/UL (ref 130–400)
POTASSIUM SERPL-SCNC: 4 MMOL/L (ref 3.5–5.1)
RBC # BLD AUTO: 3.99 MILL/UL (ref 4.7–6.1)
SARS-COV-2 RNA RESP QL NAA+PROBE: DETECTED
SODIUM SERPL-SCNC: 139 MMOL/L (ref 136–145)
WBC # BLD AUTO: 4.3 THOU/UL (ref 4.8–10.8)

## 2022-07-01 PROCEDURE — 36415 COLL VENOUS BLD VENIPUNCTURE: CPT

## 2022-07-01 PROCEDURE — 85025 COMPLETE CBC W/AUTO DIFF WBC: CPT

## 2022-07-01 PROCEDURE — 80053 COMPREHEN METABOLIC PANEL: CPT

## 2022-07-01 PROCEDURE — 80061 LIPID PANEL: CPT

## 2022-07-01 PROCEDURE — 83090 ASSAY OF HOMOCYSTEINE: CPT

## 2022-07-01 PROCEDURE — 85652 RBC SED RATE AUTOMATED: CPT

## 2022-07-01 PROCEDURE — 84484 ASSAY OF TROPONIN QUANT: CPT

## 2022-07-01 PROCEDURE — 81001 URINALYSIS AUTO W/SCOPE: CPT

## 2022-07-01 PROCEDURE — 93880 EXTRACRANIAL BILAT STUDY: CPT

## 2022-07-01 PROCEDURE — 86225 DNA ANTIBODY NATIVE: CPT

## 2022-07-01 PROCEDURE — 8E0ZXY6 ISOLATION: ICD-10-PCS | Performed by: INTERNAL MEDICINE

## 2022-07-01 PROCEDURE — 93005 ELECTROCARDIOGRAM TRACING: CPT

## 2022-07-01 PROCEDURE — 70450 CT HEAD/BRAIN W/O DYE: CPT

## 2022-07-01 PROCEDURE — 86038 ANTINUCLEAR ANTIBODIES: CPT

## 2022-07-01 PROCEDURE — 94664 DEMO&/EVAL PT USE INHALER: CPT

## 2022-07-01 PROCEDURE — 71045 X-RAY EXAM CHEST 1 VIEW: CPT

## 2022-07-01 PROCEDURE — 70551 MRI BRAIN STEM W/O DYE: CPT

## 2022-07-02 LAB
ALBUMIN SERPL BCG-MCNC: 3.1 G/DL (ref 3.4–4.8)
ALP SERPL-CCNC: 55 U/L (ref 40–110)
ALT SERPL W P-5'-P-CCNC: 11 U/L (ref 8–55)
ANION GAP SERPL CALC-SCNC: 11 MMOL/L (ref 10–20)
AST SERPL-CCNC: 18 U/L (ref 5–34)
BASOPHILS # BLD AUTO: 0 THOU/UL (ref 0–0.2)
BASOPHILS NFR BLD AUTO: 0.1 % (ref 0–1)
BILIRUB SERPL-MCNC: 0.3 MG/DL (ref 0.2–1.2)
BUN SERPL-MCNC: 15 MG/DL (ref 8.4–25.7)
CALCIUM SERPL-MCNC: 9 MG/DL (ref 7.8–10.44)
CHD RISK SERPL-RTO: 3.1 (ref ?–4.5)
CHLORIDE SERPL-SCNC: 105 MMOL/L (ref 98–107)
CHOLEST SERPL-MCNC: 119 MG/DL
CO2 SERPL-SCNC: 27 MMOL/L (ref 23–31)
CREAT CL PREDICTED SERPL C-G-VRATE: 65 ML/MIN (ref 70–130)
EOSINOPHIL # BLD AUTO: 0 THOU/UL (ref 0–0.7)
EOSINOPHIL NFR BLD AUTO: 0.5 % (ref 0–10)
GLOBULIN SER CALC-MCNC: 3.6 G/DL (ref 2.4–3.5)
GLUCOSE SERPL-MCNC: 128 MG/DL (ref 83–110)
HDLC SERPL-MCNC: 39 MG/DL
HGB BLD-MCNC: 13.4 G/DL (ref 14–18)
LDLC SERPL CALC-MCNC: 74 MG/DL
LYMPHOCYTES # BLD: 0.8 THOU/UL (ref 1.2–3.4)
LYMPHOCYTES NFR BLD AUTO: 16.6 % (ref 21–51)
MCH RBC QN AUTO: 33.7 PG (ref 27–31)
MCV RBC AUTO: 104 FL (ref 78–98)
MONOCYTES # BLD AUTO: 0.2 THOU/UL (ref 0.11–0.59)
MONOCYTES NFR BLD AUTO: 3.5 % (ref 0–10)
NEUTROPHILS # BLD AUTO: 3.6 THOU/UL (ref 1.4–6.5)
NEUTROPHILS NFR BLD AUTO: 79.4 % (ref 42–75)
PLATELET # BLD AUTO: 101 THOU/UL (ref 130–400)
POTASSIUM SERPL-SCNC: 4.1 MMOL/L (ref 3.5–5.1)
PROT UR STRIP.AUTO-MCNC: 30 MG/DL
RBC # BLD AUTO: 3.96 MILL/UL (ref 4.7–6.1)
RBC UR QL AUTO: (no result) HPF (ref 0–3)
SODIUM SERPL-SCNC: 139 MMOL/L (ref 136–145)
SP GR UR STRIP: 1.03 (ref 1–1.04)
TRIGL SERPL-MCNC: 28 MG/DL (ref ?–150)
WBC # BLD AUTO: 4.5 THOU/UL (ref 4.8–10.8)
WBC UR QL AUTO: (no result) HPF (ref 0–3)

## 2022-07-02 RX ADMIN — Medication SCH UNITS: at 09:03

## 2022-07-02 RX ADMIN — Medication SCH MG: at 09:03

## 2022-07-03 RX ADMIN — MOMETASONE FUROATE AND FORMOTEROL FUMARATE DIHYDRATE SCH PUFF: 200; 5 AEROSOL RESPIRATORY (INHALATION) at 18:09

## 2022-07-03 RX ADMIN — Medication SCH MG: at 08:44

## 2022-07-03 RX ADMIN — Medication SCH UNITS: at 08:44

## 2022-07-03 RX ADMIN — ASPIRIN SCH MG: 81 TABLET ORAL at 08:44

## 2022-07-04 RX ADMIN — Medication SCH UNITS: at 09:14

## 2022-07-04 RX ADMIN — Medication SCH MG: at 09:14

## 2022-07-04 RX ADMIN — ALUMINUM ZIRCONIUM TRICHLOROHYDREX GLY SCH: 0.2 STICK TOPICAL at 10:43

## 2022-07-04 RX ADMIN — MOMETASONE FUROATE AND FORMOTEROL FUMARATE DIHYDRATE SCH PUFF: 200; 5 AEROSOL RESPIRATORY (INHALATION) at 06:33

## 2022-07-04 RX ADMIN — ASPIRIN SCH MG: 81 TABLET ORAL at 09:14

## 2022-07-04 RX ADMIN — MOMETASONE FUROATE AND FORMOTEROL FUMARATE DIHYDRATE SCH PUFF: 200; 5 AEROSOL RESPIRATORY (INHALATION) at 17:45

## 2022-07-05 RX ADMIN — MOMETASONE FUROATE AND FORMOTEROL FUMARATE DIHYDRATE SCH PUFF: 200; 5 AEROSOL RESPIRATORY (INHALATION) at 18:00

## 2022-07-05 RX ADMIN — Medication SCH MG: at 10:19

## 2022-07-05 RX ADMIN — ASPIRIN SCH: 81 TABLET ORAL at 10:20

## 2022-07-05 RX ADMIN — Medication SCH UNITS: at 10:19

## 2022-07-05 RX ADMIN — ASPIRIN 81 MG CHEWABLE TABLET SCH MG: 81 TABLET CHEWABLE at 10:18

## 2022-07-05 RX ADMIN — MOMETASONE FUROATE AND FORMOTEROL FUMARATE DIHYDRATE SCH PUFF: 200; 5 AEROSOL RESPIRATORY (INHALATION) at 05:56

## 2022-07-06 VITALS — SYSTOLIC BLOOD PRESSURE: 150 MMHG | DIASTOLIC BLOOD PRESSURE: 76 MMHG | TEMPERATURE: 97.8 F

## 2022-07-06 RX ADMIN — ALUMINUM ZIRCONIUM TRICHLOROHYDREX GLY SCH: 0.2 STICK TOPICAL at 05:26

## 2022-07-06 RX ADMIN — MOMETASONE FUROATE AND FORMOTEROL FUMARATE DIHYDRATE SCH PUFF: 200; 5 AEROSOL RESPIRATORY (INHALATION) at 06:34

## 2022-07-06 RX ADMIN — ASPIRIN 81 MG CHEWABLE TABLET SCH MG: 81 TABLET CHEWABLE at 09:36

## 2022-07-06 RX ADMIN — Medication SCH UNITS: at 09:36

## 2022-07-06 RX ADMIN — Medication SCH MG: at 09:36

## 2022-07-08 LAB
ANA SYMPHONY (QUANTITATIVE): 0.4 RATIO
DSDNA IGG SERPL IA-ACNC: 1.3 IU/ML
DSDNA INTERPRETATION: (no result)

## 2022-08-09 ENCOUNTER — HOSPITAL ENCOUNTER (EMERGENCY)
Dept: HOSPITAL 92 - ERS | Age: 87
Discharge: LEFT BEFORE DISCHARGE | End: 2022-08-09
Payer: MEDICARE

## 2022-08-09 DIAGNOSIS — I25.10: ICD-10-CM

## 2022-08-09 DIAGNOSIS — I10: ICD-10-CM

## 2022-08-09 DIAGNOSIS — Z79.899: ICD-10-CM

## 2022-08-09 DIAGNOSIS — Z79.82: ICD-10-CM

## 2022-08-09 DIAGNOSIS — U07.1: Primary | ICD-10-CM

## 2022-08-09 DIAGNOSIS — R55: ICD-10-CM

## 2022-08-09 DIAGNOSIS — I25.2: ICD-10-CM

## 2022-08-09 LAB
ALBUMIN SERPL BCG-MCNC: 3.4 G/DL (ref 3.4–4.8)
ALP SERPL-CCNC: 65 U/L (ref 40–110)
ALT SERPL W P-5'-P-CCNC: 16 U/L (ref 8–55)
ANION GAP SERPL CALC-SCNC: 14 MMOL/L (ref 10–20)
AST SERPL-CCNC: 25 U/L (ref 5–34)
BASOPHILS # BLD AUTO: 0 THOU/UL (ref 0–0.2)
BASOPHILS NFR BLD AUTO: 0.4 % (ref 0–1)
BILIRUB SERPL-MCNC: 0.4 MG/DL (ref 0.2–1.2)
BUN SERPL-MCNC: 14 MG/DL (ref 8.4–25.7)
CALCIUM SERPL-MCNC: 9.5 MG/DL (ref 7.8–10.44)
CHLORIDE SERPL-SCNC: 107 MMOL/L (ref 98–107)
CO2 SERPL-SCNC: 25 MMOL/L (ref 23–31)
CREAT CL PREDICTED SERPL C-G-VRATE: 0 ML/MIN (ref 70–130)
EOSINOPHIL # BLD AUTO: 0.2 THOU/UL (ref 0–0.7)
EOSINOPHIL NFR BLD AUTO: 3.5 % (ref 0–10)
GLOBULIN SER CALC-MCNC: 4 G/DL (ref 2.4–3.5)
GLUCOSE SERPL-MCNC: 157 MG/DL (ref 83–110)
HGB BLD-MCNC: 12.7 G/DL (ref 14–18)
LYMPHOCYTES # BLD: 1.8 THOU/UL (ref 1.2–3.4)
LYMPHOCYTES NFR BLD AUTO: 35.9 % (ref 21–51)
MCH RBC QN AUTO: 33.5 PG (ref 27–31)
MCV RBC AUTO: 103 FL (ref 78–98)
MONOCYTES # BLD AUTO: 0.4 THOU/UL (ref 0.11–0.59)
MONOCYTES NFR BLD AUTO: 8 % (ref 0–10)
NEUTROPHILS # BLD AUTO: 2.6 THOU/UL (ref 1.4–6.5)
NEUTROPHILS NFR BLD AUTO: 52.4 % (ref 42–75)
PLATELET # BLD AUTO: 146 THOU/UL (ref 130–400)
POTASSIUM SERPL-SCNC: 4.9 MMOL/L (ref 3.5–5.1)
RBC # BLD AUTO: 3.78 MILL/UL (ref 4.7–6.1)
SARS-COV-2 RNA RESP QL NAA+PROBE: DETECTED
SODIUM SERPL-SCNC: 141 MMOL/L (ref 136–145)
WBC # BLD AUTO: 4.9 THOU/UL (ref 4.8–10.8)

## 2022-08-09 PROCEDURE — 83605 ASSAY OF LACTIC ACID: CPT

## 2022-08-09 PROCEDURE — 80053 COMPREHEN METABOLIC PANEL: CPT

## 2022-08-09 PROCEDURE — 93005 ELECTROCARDIOGRAM TRACING: CPT

## 2022-08-09 PROCEDURE — 99285 EMERGENCY DEPT VISIT HI MDM: CPT

## 2022-08-09 PROCEDURE — 71045 X-RAY EXAM CHEST 1 VIEW: CPT

## 2022-08-09 PROCEDURE — 85025 COMPLETE CBC W/AUTO DIFF WBC: CPT

## 2022-08-09 PROCEDURE — 84484 ASSAY OF TROPONIN QUANT: CPT

## 2022-08-09 PROCEDURE — 70450 CT HEAD/BRAIN W/O DYE: CPT

## 2022-08-09 PROCEDURE — 0240U: CPT

## 2022-10-17 ENCOUNTER — HOSPITAL ENCOUNTER (EMERGENCY)
Dept: HOSPITAL 92 - ERS | Age: 87
Discharge: HOME | End: 2022-10-17
Payer: MEDICARE

## 2022-10-17 DIAGNOSIS — R53.1: ICD-10-CM

## 2022-10-17 DIAGNOSIS — I25.2: ICD-10-CM

## 2022-10-17 DIAGNOSIS — R29.726: ICD-10-CM

## 2022-10-17 DIAGNOSIS — Z79.899: ICD-10-CM

## 2022-10-17 DIAGNOSIS — R47.1: Primary | ICD-10-CM

## 2022-10-17 DIAGNOSIS — I10: ICD-10-CM

## 2022-10-17 DIAGNOSIS — I25.10: ICD-10-CM

## 2022-10-17 DIAGNOSIS — Z79.82: ICD-10-CM

## 2022-10-17 LAB
ALBUMIN SERPL BCG-MCNC: 3.1 G/DL (ref 3.4–4.8)
ALP SERPL-CCNC: 63 U/L (ref 40–110)
ALT SERPL W P-5'-P-CCNC: 13 U/L (ref 8–55)
ANION GAP SERPL CALC-SCNC: 14 MMOL/L (ref 10–20)
APTT PPP: 26.2 SEC (ref 22.9–36.1)
AST SERPL-CCNC: 15 U/L (ref 5–34)
BASOPHILS # BLD AUTO: 0 THOU/UL (ref 0–0.2)
BASOPHILS NFR BLD AUTO: 0.2 % (ref 0–1)
BILIRUB SERPL-MCNC: 0.3 MG/DL (ref 0.2–1.2)
BUN SERPL-MCNC: 17 MG/DL (ref 8.4–25.7)
CALCIUM SERPL-MCNC: 9 MG/DL (ref 7.8–10.44)
CHLORIDE SERPL-SCNC: 108 MMOL/L (ref 98–107)
CO2 SERPL-SCNC: 22 MMOL/L (ref 23–31)
CREAT CL PREDICTED SERPL C-G-VRATE: 0 ML/MIN (ref 70–130)
EOSINOPHIL # BLD AUTO: 0.2 THOU/UL (ref 0–0.7)
EOSINOPHIL NFR BLD AUTO: 2.8 % (ref 0–10)
GLOBULIN SER CALC-MCNC: 3.3 G/DL (ref 2.4–3.5)
GLUCOSE SERPL-MCNC: 127 MG/DL (ref 83–110)
HGB BLD-MCNC: 12.1 G/DL (ref 14–18)
INR PPP: 1.2
LYMPHOCYTES # BLD: 2 THOU/UL (ref 1.2–3.4)
LYMPHOCYTES NFR BLD AUTO: 27.7 % (ref 21–51)
MCH RBC QN AUTO: 32.5 PG (ref 27–31)
MCV RBC AUTO: 104 FL (ref 78–98)
MONOCYTES # BLD AUTO: 0.4 THOU/UL (ref 0.11–0.59)
MONOCYTES NFR BLD AUTO: 6.2 % (ref 0–10)
NEUTROPHILS # BLD AUTO: 4.5 THOU/UL (ref 1.4–6.5)
NEUTROPHILS NFR BLD AUTO: 63.2 % (ref 42–75)
PLATELET # BLD AUTO: 151 THOU/UL (ref 130–400)
POTASSIUM SERPL-SCNC: 3.9 MMOL/L (ref 3.5–5.1)
PROTHROMBIN TIME: 15.3 SEC (ref 12–14.7)
RBC # BLD AUTO: 3.72 MILL/UL (ref 4.7–6.1)
SODIUM SERPL-SCNC: 140 MMOL/L (ref 136–145)
WBC # BLD AUTO: 7.1 THOU/UL (ref 4.8–10.8)

## 2022-10-17 PROCEDURE — 85025 COMPLETE CBC W/AUTO DIFF WBC: CPT

## 2022-10-17 PROCEDURE — 93005 ELECTROCARDIOGRAM TRACING: CPT

## 2022-10-17 PROCEDURE — 80053 COMPREHEN METABOLIC PANEL: CPT

## 2022-10-17 PROCEDURE — 85730 THROMBOPLASTIN TIME PARTIAL: CPT

## 2022-10-17 PROCEDURE — 70498 CT ANGIOGRAPHY NECK: CPT

## 2022-10-17 PROCEDURE — 94760 N-INVAS EAR/PLS OXIMETRY 1: CPT

## 2022-10-17 PROCEDURE — 85610 PROTHROMBIN TIME: CPT

## 2022-10-17 PROCEDURE — 36415 COLL VENOUS BLD VENIPUNCTURE: CPT

## 2022-10-17 PROCEDURE — 70496 CT ANGIOGRAPHY HEAD: CPT

## 2022-10-17 PROCEDURE — 84484 ASSAY OF TROPONIN QUANT: CPT

## 2022-10-17 PROCEDURE — 70450 CT HEAD/BRAIN W/O DYE: CPT

## 2022-11-16 ENCOUNTER — HOSPITAL ENCOUNTER (OUTPATIENT)
Dept: HOSPITAL 92 - LABBT | Age: 87
Discharge: HOME | End: 2022-11-16
Attending: ORTHOPAEDIC SURGERY
Payer: MEDICARE

## 2022-11-16 DIAGNOSIS — M24.532: ICD-10-CM

## 2022-11-16 DIAGNOSIS — M24.542: ICD-10-CM

## 2022-11-16 DIAGNOSIS — Z01.818: Primary | ICD-10-CM

## 2022-11-16 LAB
BASOPHILS # BLD AUTO: 0 10X3/UL (ref 0–0.2)
BASOPHILS NFR BLD AUTO: 0.9 % (ref 0–2)
EOSINOPHIL # BLD AUTO: 0.2 10X3/UL (ref 0–0.5)
EOSINOPHIL NFR BLD AUTO: 6 % (ref 0–6)
HGB BLD-MCNC: 13.4 G/DL (ref 13.5–17.5)
LYMPHOCYTES NFR BLD AUTO: 45 % (ref 18–47)
MCH RBC QN AUTO: 32.9 PG (ref 27–33)
MCV RBC AUTO: 98.8 FL (ref 81.2–95.1)
MONOCYTES # BLD AUTO: 0.3 10X3/UL (ref 0–1.1)
MONOCYTES NFR BLD AUTO: 8.8 % (ref 0–10)
NEUTROPHILS # BLD AUTO: 1.3 10X3/UL (ref 1.5–8.4)
NEUTROPHILS NFR BLD AUTO: 39 % (ref 40–75)
PLATELET # BLD AUTO: 148 10X3/UL (ref 150–450)
RBC # BLD AUTO: 4.07 10X6/UL (ref 4.32–5.72)
WBC # BLD AUTO: 3.3 10X3/UL (ref 3.5–10.5)

## 2022-11-16 PROCEDURE — 85025 COMPLETE CBC W/AUTO DIFF WBC: CPT

## 2022-11-16 PROCEDURE — 93010 ELECTROCARDIOGRAM REPORT: CPT

## 2022-11-16 PROCEDURE — 93005 ELECTROCARDIOGRAM TRACING: CPT

## 2022-11-18 ENCOUNTER — HOSPITAL ENCOUNTER (OUTPATIENT)
Dept: HOSPITAL 92 - SDC | Age: 87
Discharge: HOME | End: 2022-11-18
Attending: ORTHOPAEDIC SURGERY
Payer: MEDICARE

## 2022-11-18 VITALS — BODY MASS INDEX: 21.7 KG/M2

## 2022-11-18 DIAGNOSIS — M75.02: ICD-10-CM

## 2022-11-18 DIAGNOSIS — I25.2: ICD-10-CM

## 2022-11-18 DIAGNOSIS — G56.03: ICD-10-CM

## 2022-11-18 DIAGNOSIS — I10: ICD-10-CM

## 2022-11-18 DIAGNOSIS — Z98.1: ICD-10-CM

## 2022-11-18 DIAGNOSIS — Z87.891: ICD-10-CM

## 2022-11-18 DIAGNOSIS — M75.102: ICD-10-CM

## 2022-11-18 DIAGNOSIS — J45.909: ICD-10-CM

## 2022-11-18 DIAGNOSIS — M24.522: Primary | ICD-10-CM

## 2022-11-18 DIAGNOSIS — M48.02: ICD-10-CM

## 2022-11-18 DIAGNOSIS — G56.23: ICD-10-CM

## 2022-11-18 DIAGNOSIS — Z79.02: ICD-10-CM

## 2022-11-18 DIAGNOSIS — Z95.1: ICD-10-CM

## 2022-11-18 DIAGNOSIS — Z79.899: ICD-10-CM

## 2022-11-18 DIAGNOSIS — I25.10: ICD-10-CM

## 2022-11-18 DIAGNOSIS — M18.0: ICD-10-CM

## 2022-11-18 PROCEDURE — 64642 CHEMODENERV 1 EXTREMITY 1-4: CPT

## 2022-11-18 PROCEDURE — 3E023GC INTRODUCTION OF OTHER THERAPEUTIC SUBSTANCE INTO MUSCLE, PERCUTANEOUS APPROACH: ICD-10-PCS | Performed by: ORTHOPAEDIC SURGERY

## 2022-11-18 PROCEDURE — 73070 X-RAY EXAM OF ELBOW: CPT

## 2022-12-01 ENCOUNTER — HOSPITAL ENCOUNTER (EMERGENCY)
Dept: HOSPITAL 92 - CSHERS | Age: 87
Discharge: HOME | End: 2022-12-01
Payer: MEDICARE

## 2022-12-01 DIAGNOSIS — J45.909: ICD-10-CM

## 2022-12-01 DIAGNOSIS — I25.10: ICD-10-CM

## 2022-12-01 DIAGNOSIS — D64.9: Primary | ICD-10-CM

## 2022-12-01 DIAGNOSIS — Z79.899: ICD-10-CM

## 2022-12-01 DIAGNOSIS — I10: ICD-10-CM

## 2022-12-01 LAB
ALBUMIN SERPL BCG-MCNC: 3.3 G/DL (ref 3.4–4.8)
ALP SERPL-CCNC: 68 U/L (ref 40–110)
ALT SERPL W P-5'-P-CCNC: 16 U/L (ref 8–55)
ANION GAP SERPL CALC-SCNC: 11 MMOL/L (ref 10–20)
AST SERPL-CCNC: 21 U/L (ref 5–34)
BASOPHILS # BLD AUTO: 0 10X3/UL (ref 0–0.2)
BASOPHILS NFR BLD AUTO: 0.3 % (ref 0–2)
BILIRUB SERPL-MCNC: 0.5 MG/DL (ref 0.2–1.2)
BUN SERPL-MCNC: 23 MG/DL (ref 8.4–25.7)
CALCIUM SERPL-MCNC: 9.2 MG/DL (ref 7.8–10.44)
CHLORIDE SERPL-SCNC: 110 MMOL/L (ref 98–107)
CO2 SERPL-SCNC: 23 MMOL/L (ref 23–31)
CREAT CL PREDICTED SERPL C-G-VRATE: 0 ML/MIN (ref 70–130)
EOSINOPHIL # BLD AUTO: 0.1 10X3/UL (ref 0–0.5)
EOSINOPHIL NFR BLD AUTO: 0.6 % (ref 0–6)
GLOBULIN SER CALC-MCNC: 3.3 G/DL (ref 2.4–3.5)
GLUCOSE SERPL-MCNC: 114 MG/DL (ref 83–110)
HGB BLD-MCNC: 10.5 G/DL (ref 13.5–17.5)
LYMPHOCYTES NFR BLD AUTO: 8.5 % (ref 18–47)
MCH RBC QN AUTO: 32.6 PG (ref 27–33)
MCV RBC AUTO: 98.8 FL (ref 81.2–95.1)
MONOCYTES # BLD AUTO: 0.6 10X3/UL (ref 0–1.1)
MONOCYTES NFR BLD AUTO: 5.9 % (ref 0–10)
NEUTROPHILS # BLD AUTO: 8 10X3/UL (ref 1.5–8.4)
NEUTROPHILS NFR BLD AUTO: 84.2 % (ref 40–75)
PLATELET # BLD AUTO: 191 10X3/UL (ref 150–450)
POTASSIUM SERPL-SCNC: 4.2 MMOL/L (ref 3.5–5.1)
RBC # BLD AUTO: 3.22 10X6/UL (ref 4.32–5.72)
SODIUM SERPL-SCNC: 140 MMOL/L (ref 136–145)
WBC # BLD AUTO: 9.5 10X3/UL (ref 3.5–10.5)

## 2022-12-01 PROCEDURE — 80053 COMPREHEN METABOLIC PANEL: CPT

## 2022-12-01 PROCEDURE — 84484 ASSAY OF TROPONIN QUANT: CPT

## 2022-12-01 PROCEDURE — 85025 COMPLETE CBC W/AUTO DIFF WBC: CPT

## 2022-12-01 PROCEDURE — 70450 CT HEAD/BRAIN W/O DYE: CPT

## 2022-12-01 PROCEDURE — 93005 ELECTROCARDIOGRAM TRACING: CPT
